# Patient Record
Sex: MALE | Race: WHITE | NOT HISPANIC OR LATINO | Employment: FULL TIME | URBAN - METROPOLITAN AREA
[De-identification: names, ages, dates, MRNs, and addresses within clinical notes are randomized per-mention and may not be internally consistent; named-entity substitution may affect disease eponyms.]

---

## 2023-08-18 ENCOUNTER — MEDICAL CORRESPONDENCE (OUTPATIENT)
Dept: HEALTH INFORMATION MANAGEMENT | Facility: CLINIC | Age: 53
End: 2023-08-18
Payer: COMMERCIAL

## 2023-08-18 ENCOUNTER — TRANSFERRED RECORDS (OUTPATIENT)
Dept: HEALTH INFORMATION MANAGEMENT | Facility: CLINIC | Age: 53
End: 2023-08-18
Payer: COMMERCIAL

## 2023-08-25 ENCOUNTER — TRANSFERRED RECORDS (OUTPATIENT)
Dept: HEALTH INFORMATION MANAGEMENT | Facility: CLINIC | Age: 53
End: 2023-08-25

## 2023-08-28 ENCOUNTER — OFFICE VISIT (OUTPATIENT)
Dept: CARDIOLOGY | Facility: CLINIC | Age: 53
End: 2023-08-28
Payer: COMMERCIAL

## 2023-08-28 VITALS
WEIGHT: 228 LBS | RESPIRATION RATE: 16 BRPM | BODY MASS INDEX: 30.22 KG/M2 | SYSTOLIC BLOOD PRESSURE: 114 MMHG | DIASTOLIC BLOOD PRESSURE: 60 MMHG | HEIGHT: 73 IN | HEART RATE: 52 BPM

## 2023-08-28 DIAGNOSIS — R55 VASOVAGAL SYNCOPE: Primary | ICD-10-CM

## 2023-08-28 DIAGNOSIS — E78.5 HYPERLIPIDEMIA LDL GOAL <100: ICD-10-CM

## 2023-08-28 PROCEDURE — 99204 OFFICE O/P NEW MOD 45 MIN: CPT | Performed by: INTERNAL MEDICINE

## 2023-08-28 RX ORDER — CETIRIZINE HYDROCHLORIDE 10 MG/1
10 TABLET ORAL DAILY
COMMUNITY

## 2023-08-28 RX ORDER — TADALAFIL 5 MG/1
5 TABLET ORAL PRN
COMMUNITY
Start: 2022-03-09

## 2023-08-28 NOTE — PATIENT INSTRUCTIONS
We discussed vasodepressor syncope which is a sudden drop in blood pressure typically when standing or changing positions promptly..We discussed keeping yourself well hydrated but add electrolytes. Gatorade, liquid IV would be a good choice.We talked about liberalizing the salt in your diet and at least thinking about adding back some carbs.I will be in touch with the echo results and the calcium score.My nurse is Mariola and her number is [phone].  Please make an appointment to see your primary physician regarding the sensation of a lump in your throat feeling.  Please call with any additional questions or concerns.

## 2023-08-28 NOTE — LETTER
8/28/2023    Jorge L Villa MD  [facility] [address] [place] [id number]    RE: Jorge L Anglin       Dear Colleague,     I had the pleasure of seeing Jorge L Anglin in the [facility].    HEART CARE ENCOUNTER CONSULTATON NOTE      [facility]  [phone]      Assessment/Recommendations   Assessment/Plan:   1.  History of syncopal episodes and lightheaded episodes that appear primarily associated with standing or bending over.  Symptoms sound consistent with vasodepressive type syncope.  I reviewed this in detail with him today.  We talked about nonmedication maneuvers that he could consider as the initial approach.  We talked about keeping himself well-hydrated and using electrolyte solutions like liquid IV.  We talked about being conscious of changing positions slowly and recognizing when he feels lightheaded and sitting down or lying down until it passes.  He does follow-up relatively strict keto/low carbohydrate diet as well as tries not to eat a significant amount of salt in his diet.  He does not have high blood pressure and in fact runs a somewhat lower blood pressure.  I discussed that he may wish to consider liberalizing the salt in his diet by eating pickles or other similar type foods that are not fried or processed.  We also discussed the potential relationship to the keto diet and I asked him to at least consider liberalizing the carbohydrates in his diet which she was going to consider.  In addition support stockings could be considered.  At this point I would not consider medical treatment but would asked that he try to make some of these lifestyle changes and then update me with how he is doing.  Given history of prior pericarditis with I have recommended a follow-up echocardiogram as well.    2.  Dyslipidemia.  Cholesterol numbers recently completed revealed a total cholesterol of 218, HDL 61, LDL of 140.  He had a CT scan that reported no  coronary calcium a few years ago but we discussed follow-up coronary calcium scoring to further define current risk.  He is in agreement.    3.  He mentions a awareness of a lump in his throat that is somewhat persistent.  I asked him to be in touch with his primary care physician with this finding.    1.  Complete the event monitor that he is currently wearing  2.  Echocardiogram given the lightheadedness and prior pericarditis history  3.  Coronary calcium scoring  4.  Liberalize electrolytes and fluid intake.  We discussed liberalizing salt in his diet and considering increasing amount of carbohydrates that he eats in his diet.  He will contemplate these recommendations.  5.  Follow-up pending the above.  6.  Follow-up with primary care regarding this awareness of a lump sensation in his throat that is persistent.      ECG today reveals sinus bradycardia 55 bpm otherwise normal-appearing EKG.     History of Present Illness/Subjective    HPI: Jorge L Anglin is a 53 year old male who is seen as a new patient.Hx of Pericarditis in 2021 per chart review.  He presented with chest discomfort and pericardial type pain in May 2021.  Interestingly, this was shortly after his second Covid vaccination shot.  He was diagnosed with acute pericarditis.  He had significant elevation of his C-reactive protein.  He had an echocardiogram that raised question of very early constriction.  He was started on anti-inflammatory therapy and colchicine.  This is based on the note from Dr. Linares in 2021.  Recommendations were for follow-up lipids.  Recommendations were for consideration of coronary calcium score in 5 years.  He reportedly had a chest CT that demonstrated no coronary calcium at the time of the diagnosis of pericarditis.  I have located the CT it was not dedicated calcium score but rather a CT of the chest and abdomen which commented on no calcium identified.     His C-reactive protein quickly normalized.  He felt much  better.  In June 2021 he underwent a cardiac MRI.  This showed evidence for the pericarditis but no evidence of constriction.  No significant myocarditis was seen.  There is a nuclear stress test report from December 2022 that reported the patient exercised 10 minutes on a Mehdi protocol with a normal stress and rest perfusion with an ejection fraction of 66%.  There is an echocardiogram report from November 2021 that demonstrated trivial pericardial effusion with normal left ventricular systolic function and mild left ventricular hypertrophy.  Notes from primary care are reviewed.  The note indicates this history of acute idiopathic pericarditis.  He had reported 4 syncopal episodes at the time of his visit August 19, 2023.  A 14-day event monitor was ordered.    He is experiencing intermittent lightheadedness and 3 episodes of syncope.  Lightheaded episodes are either with standing abruptly, bending over or standing for length of time.  The 3 syncopal episodes date back over the last 2 years.  One episode was while getting out of the shower, second episode while getting out of the hot tub third episode while standing for a long period of time in [place].  He does indicate that he has a few warning and then he feels lightheaded and that his vision is closing and then lost consciousness.    He follows a keto diet and continues to eat a low carbohydrate diet.  He tries to limit the salt in his diet.    Recent lab work includes: Potassium of 4, chloride 102, CO2 26, BUN of 12, creatinine 0.93, calcium 9.8, AST of 26, ALT of 35, there is a sodium listed which is difficult to discern but it is listed as being from January 2020 but no sodium on the most recent lab work.  Cholesterol total of 218, HDL 61, , triglycerides of 73.  Recent Echocardiogram Results:  See note describing above.        INDICATION(S): Chest pain     I have reviewed the indications for the stress test, patient past medical   history, current  medications, and the notes of the nurse richard.     Resting EKG: normal EKG, normal sinus rhythm, unchanged from previous   tracings.     Jorge L Anglin started in Stage 1 of the Mehdi protocol and exercised   for total duration of 10:00 minutes to Stage 4, reaching a maximum heart   rate of  169 , (target heart rate 166), maximum /76 [place]   [place] of [address].  The exercise was stopped due to fatigue . Patient   symptoms: fatigue  EKG showed no arrhymthia and no ischemic changes  .    The patient was followed in the recovery phase for 5 minutes. Cardiolyte   images were obtained before and after exercise.       ASSESSMENT:      negative based on No ECG evidence of ischemia.      Satisfactory stress test with adequate heart rate and double product.     See separate report for results of myocardial perfusion images.     Please contact me if you have any questions regarding the EKG portion of   this stress test.     Saul Ortiz MD ....................  12/9/2022   12:32 PM      Physical Examination  Review of Systems   Vitals: 114/60, 118/68 during my examination, weight 228 pounds, heart rate of 52-65 and regular  Wt Readings from Last 3 Encounters:   No data found for Wt       General Appearance:   no distress, normal body habitus   ENT/Mouth: membranes moist, no oral lesions or bleeding gums.      EYES:  no scleral icterus, normal conjunctivae   Neck: no carotid bruits    Chest/Lungs:   lungs are clear to auscultation, no rales or wheezing, equal chest wall expansion    Cardiovascular:   Regular. Normal first and second heart sounds with 1/6 to 2/6 systolic murmur left ventricular apex, rubs, or gallops; the carotid, radial and posterior tibial pulses are intact, Jugular venous pressure within normal limits, no significant edema bilaterally    Abdomen:  no bruits, or tenderness; bowel sounds are present   Extremities: no cyanosis or clubbing   Skin: no xanthelasma, warm.    Neurologic:  no  tremors     Psychiatric: alert and oriented x3, calm        Please refer above for cardiac ROS details.        Medical History  Surgical History Family History Social History   History of pericarditis  Lattice degeneration of both retinas  Obesity  Erectile dysfunction  Syncope No past surgical history on file.  Mother diabetes, melanoma,  Father: Heart disease myocardial infarction  Family history of colon cancer     Social History     Socioeconomic History    Marital status:      Spouse name: Not on file    Number of children: Not on file    Years of education: Not on file    Highest education level: Not on file   Occupational History    Not on file   Tobacco Use    Smoking status: Not on file    Smokeless tobacco: Not on file   Substance and Sexual Activity    Alcohol use: Not on file    Drug use: Not on file    Sexual activity: Not on file   Other Topics Concern    Not on file   Social History Narrative    Not on file     Social Determinants of Health     Financial Resource Strain: Not on file   Food Insecurity: Not on file   Transportation Needs: Not on file   Physical Activity: Not on file   Stress: Not on file   Social Connections: Not on file   Intimate Partner Violence: Not on file   Housing Stability: Not on file           Medications  Allergies   No current outpatient medications on file.     Not on File       Lab Results    Chemistry/lipid CBC Cardiac Enzymes/BNP/TSH/INR   No results for input(s): CHOL, HDL, LDL, TRIG, CHOLHDLRATIO in the last 58897 hours.  No results for input(s): LDL in the last 77799 hours.  No results for input(s): NA, POTASSIUM, CHLORIDE, CO2, GLC, BUN, CR, GFRESTIMATED, RADHA in the last 49313 hours.    Invalid input(s): GRFESTBLACK  No results for input(s): CR in the last 02932 hours.  No results for input(s): A1C in the last 82986 hours.       No results for input(s): WBC, HGB, HCT, MCV, PLT in the last 93812 hours.  No results for input(s): HGB in the last 54595 hours. No  results for input(s): TROPONINI in the last 63766 hours.  No results for input(s): BNP, NTBNPI, NTBNP in the last 30464 hours.  No results for input(s): TSH in the last 43925 hours.  No results for input(s): INR in the last 91701 hours.     Jovanny Sebastian MD      Thank you for allowing me to participate in the care of your patient.      Sincerely,     Jovanny Sebastian MD     [facility]  cc:   [place] [address]

## 2023-08-28 NOTE — PROGRESS NOTES
HEART CARE ENCOUNTER CONSULTATON NOTE      [facility]  [phone]      Assessment/Recommendations   Assessment/Plan:   1.  History of syncopal episodes and lightheaded episodes that appear primarily associated with standing or bending over.  Symptoms sound consistent with vasodepressive type syncope.  I reviewed this in detail with him today.  We talked about nonmedication maneuvers that he could consider as the initial approach.  We talked about keeping himself well-hydrated and using electrolyte solutions like liquid IV.  We talked about being conscious of changing positions slowly and recognizing when he feels lightheaded and sitting down or lying down until it passes.  He does follow-up relatively strict keto/low carbohydrate diet as well as tries not to eat a significant amount of salt in his diet.  He does not have high blood pressure and in fact runs a somewhat lower blood pressure.  I discussed that he may wish to consider liberalizing the salt in his diet by eating pickles or other similar type foods that are not fried or processed.  We also discussed the potential relationship to the keto diet and I asked him to at least consider liberalizing the carbohydrates in his diet which she was going to consider.  In addition support stockings could be considered.  At this point I would not consider medical treatment but would asked that he try to make some of these lifestyle changes and then update me with how he is doing.  Given history of prior pericarditis with I have recommended a follow-up echocardiogram as well.    2.  Dyslipidemia.  Cholesterol numbers recently completed revealed a total cholesterol of 218, HDL 61, LDL of 140.  He had a CT scan that reported no coronary calcium a few years ago but we discussed follow-up coronary calcium scoring to further define current risk.  He is in agreement.    3.  He mentions a awareness of a lump in his throat that is somewhat persistent.  I  asked him to be in touch with his primary care physician with this finding.    1.  Complete the event monitor that he is currently wearing  2.  Echocardiogram given the lightheadedness and prior pericarditis history  3.  Coronary calcium scoring  4.  Liberalize electrolytes and fluid intake.  We discussed liberalizing salt in his diet and considering increasing amount of carbohydrates that he eats in his diet.  He will contemplate these recommendations.  5.  Follow-up pending the above.  6.  Follow-up with primary care regarding this awareness of a lump sensation in his throat that is persistent.      ECG today reveals sinus bradycardia 55 bpm otherwise normal-appearing EKG.     History of Present Illness/Subjective    HPI: Jorge L Anglin is a 53 year old male who is seen as a new patient.Hx of Pericarditis in 2021 per chart review.  He presented with chest discomfort and pericardial type pain in May 2021.  Interestingly, this was shortly after his second Covid vaccination shot.  He was diagnosed with acute pericarditis.  He had significant elevation of his C-reactive protein.  He had an echocardiogram that raised question of very early constriction.  He was started on anti-inflammatory therapy and colchicine.  This is based on the note from Dr. Linares in 2021.  Recommendations were for follow-up lipids.  Recommendations were for consideration of coronary calcium score in 5 years.  He reportedly had a chest CT that demonstrated no coronary calcium at the time of the diagnosis of pericarditis.  I have located the CT it was not dedicated calcium score but rather a CT of the chest and abdomen which commented on no calcium identified.     His C-reactive protein quickly normalized.  He felt much better.  In June 2021 he underwent a cardiac MRI.  This showed evidence for the pericarditis but no evidence of constriction.  No significant myocarditis was seen.  There is a nuclear stress test report from December 2022 that  reported the patient exercised 10 minutes on a Mehdi protocol with a normal stress and rest perfusion with an ejection fraction of 66%.  There is an echocardiogram report from November 2021 that demonstrated trivial pericardial effusion with normal left ventricular systolic function and mild left ventricular hypertrophy.  Notes from primary care are reviewed.  The note indicates this history of acute idiopathic pericarditis.  He had reported 4 syncopal episodes at the time of his visit August 19, 2023.  A 14-day event monitor was ordered.    He is experiencing intermittent lightheadedness and 3 episodes of syncope.  Lightheaded episodes are either with standing abruptly, bending over or standing for length of time.  The 3 syncopal episodes date back over the last 2 years.  One episode was while getting out of the shower, second episode while getting out of the hot tub third episode while standing for a long period of time in [place].  He does indicate that he has a few warning and then he feels lightheaded and that his vision is closing and then lost consciousness.    He follows a keto diet and continues to eat a low carbohydrate diet.  He tries to limit the salt in his diet.    Recent lab work includes: Potassium of 4, chloride 102, CO2 26, BUN of 12, creatinine 0.93, calcium 9.8, AST of 26, ALT of 35, there is a sodium listed which is difficult to discern but it is listed as being from January 2020 but no sodium on the most recent lab work.  Cholesterol total of 218, HDL 61, , triglycerides of 73.  Recent Echocardiogram Results:  See note describing above.        INDICATION(S): Chest pain     I have reviewed the indications for the stress test, patient past medical   history, current medications, and the notes of the nurse richard.     Resting EKG: normal EKG, normal sinus rhythm, unchanged from previous   tracings.     Jorge L Anglin started in Stage 1 of the Mehdi protocol and exercised   for total  duration of 10:00 minutes to Stage 4, reaching a maximum heart   rate of  169 , (target heart rate 166), maximum /76 and double   product of [address].  The exercise was stopped due to fatigue . Patient   symptoms: fatigue  EKG showed no arrhymthia and no ischemic changes  .    The patient was followed in the recovery phase for 5 minutes. Cardiolyte   images were obtained before and after exercise.       ASSESSMENT:      negative based on No ECG evidence of ischemia.      Satisfactory stress test with adequate heart rate and double product.     See separate report for results of myocardial perfusion images.     Please contact me if you have any questions regarding the EKG portion of   this stress test.     Saul Ortiz MD ....................  12/9/2022   12:32 PM      Physical Examination  Review of Systems   Vitals: 114/60, 118/68 during my examination, weight 228 pounds, heart rate of 52-65 and regular  Wt Readings from Last 3 Encounters:   No data found for Wt       General Appearance:   no distress, normal body habitus   ENT/Mouth: membranes moist, no oral lesions or bleeding gums.      EYES:  no scleral icterus, normal conjunctivae   Neck: no carotid bruits    Chest/Lungs:   lungs are clear to auscultation, no rales or wheezing, equal chest wall expansion    Cardiovascular:   Regular. Normal first and second heart sounds with 1/6 to 2/6 systolic murmur left ventricular apex, rubs, or gallops; the carotid, radial and posterior tibial pulses are intact, Jugular venous pressure within normal limits, no significant edema bilaterally    Abdomen:  no bruits, or tenderness; bowel sounds are present   Extremities: no cyanosis or clubbing   Skin: no xanthelasma, warm.    Neurologic:  no tremors     Psychiatric: alert and oriented x3, calm        Please refer above for cardiac ROS details.        Medical History  Surgical History Family History Social History   History of pericarditis  Lattice degeneration of both  retinas  Obesity  Erectile dysfunction  Syncope No past surgical history on file.  Mother diabetes, melanoma,  Father: Heart disease myocardial infarction  Family history of colon cancer     Social History     Socioeconomic History    Marital status:      Spouse name: Not on file    Number of children: Not on file    Years of education: Not on file    Highest education level: Not on file   Occupational History    Not on file   Tobacco Use    Smoking status: Not on file    Smokeless tobacco: Not on file   Substance and Sexual Activity    Alcohol use: Not on file    Drug use: Not on file    Sexual activity: Not on file   Other Topics Concern    Not on file   Social History Narrative    Not on file     Social Determinants of Health     Financial Resource Strain: Not on file   Food Insecurity: Not on file   Transportation Needs: Not on file   Physical Activity: Not on file   Stress: Not on file   Social Connections: Not on file   Intimate Partner Violence: Not on file   Housing Stability: Not on file           Medications  Allergies   No current outpatient medications on file.     Not on File       Lab Results    Chemistry/lipid CBC Cardiac Enzymes/BNP/TSH/INR   No results for input(s): CHOL, HDL, LDL, TRIG, CHOLHDLRATIO in the last 78187 hours.  No results for input(s): LDL in the last 39266 hours.  No results for input(s): NA, POTASSIUM, CHLORIDE, CO2, GLC, BUN, CR, GFRESTIMATED, RADHA in the last 06583 hours.    Invalid input(s): GRFESTBLACK  No results for input(s): CR in the last 32736 hours.  No results for input(s): A1C in the last 75052 hours.       No results for input(s): WBC, HGB, HCT, MCV, PLT in the last 49586 hours.  No results for input(s): HGB in the last 15656 hours. No results for input(s): TROPONINI in the last 84925 hours.  No results for input(s): BNP, NTBNPI, NTBNP in the last 48219 hours.  No results for input(s): TSH in the last 88321 hours.  No results for input(s): INR in the last 47644  hours.     Jovanny Sebastian MD

## 2023-09-15 ENCOUNTER — TRANSFERRED RECORDS (OUTPATIENT)
Dept: HEALTH INFORMATION MANAGEMENT | Facility: CLINIC | Age: 53
End: 2023-09-15

## 2023-09-19 ENCOUNTER — HOSPITAL ENCOUNTER (OUTPATIENT)
Dept: CT IMAGING | Facility: CLINIC | Age: 53
Discharge: HOME | End: 2023-09-19
Attending: INTERNAL MEDICINE
Payer: COMMERCIAL

## 2023-09-19 ENCOUNTER — HOSPITAL ENCOUNTER (OUTPATIENT)
Dept: CARDIOLOGY | Facility: CLINIC | Age: 53
Discharge: HOME | End: 2023-09-19
Attending: INTERNAL MEDICINE
Payer: COMMERCIAL

## 2023-09-19 DIAGNOSIS — E78.5 HYPERLIPIDEMIA LDL GOAL <100: ICD-10-CM

## 2023-09-19 DIAGNOSIS — R55 VASOVAGAL SYNCOPE: ICD-10-CM

## 2023-09-19 LAB
CV CALCIUM SCORE AGATSTON LM: 0
CV CALCIUM SCORING AGATSON LAD: 0
CV CALCIUM SCORING AGATSTON CX: 0
CV CALCIUM SCORING AGATSTON RCA: 10
CV CALCIUM SCORING AGATSTON TOTAL: 10
LVEF ECHO: NORMAL

## 2023-09-19 PROCEDURE — 75571 CT HRT W/O DYE W/CA TEST: CPT

## 2023-09-19 PROCEDURE — 75571 CT HRT W/O DYE W/CA TEST: CPT | Mod: 26 | Performed by: INTERNAL MEDICINE

## 2023-09-19 PROCEDURE — 93306 TTE W/DOPPLER COMPLETE: CPT | Mod: 26 | Performed by: INTERNAL MEDICINE

## 2023-09-19 PROCEDURE — 93306 TTE W/DOPPLER COMPLETE: CPT

## 2023-09-20 NOTE — RESULT ENCOUNTER NOTE
Echo looks normal, some left atrial enlargement,he had an event monitor on thru [facility], can we track down the results,mild increase in calcium score of 10, my chart note sent mdg

## 2023-10-31 ENCOUNTER — OFFICE VISIT (OUTPATIENT)
Dept: CARDIOLOGY | Facility: CLINIC | Age: 53
End: 2023-10-31
Attending: INTERNAL MEDICINE
Payer: COMMERCIAL

## 2023-10-31 VITALS
DIASTOLIC BLOOD PRESSURE: 68 MMHG | WEIGHT: 222 LBS | RESPIRATION RATE: 14 BRPM | HEART RATE: 52 BPM | SYSTOLIC BLOOD PRESSURE: 100 MMHG | BODY MASS INDEX: 29.29 KG/M2

## 2023-10-31 DIAGNOSIS — R55 VASOVAGAL SYNCOPE: ICD-10-CM

## 2023-10-31 DIAGNOSIS — E78.5 HYPERLIPIDEMIA LDL GOAL <100: ICD-10-CM

## 2023-10-31 PROCEDURE — 99214 OFFICE O/P EST MOD 30 MIN: CPT | Performed by: INTERNAL MEDICINE

## 2023-10-31 NOTE — PROGRESS NOTES
HEART CARE ENCOUNTER CONSULTATON NOTE      [facility]  [phone]      Assessment/Recommendations   Assessment/Plan:  1.  History of syncopal episodes and lightheadedness that appears primarily related to position changes.  I felt symptoms were most consistent with vasodepressive syncope.  We talked about keeping himself well-hydrated and we talked about use of electrolyte solutions and modifying his diet.  He has been following a very strict keto/low carbohydrate diet as well as a lower salt diet without a history of hypertension.  He does not have hypertension and in fact is running somewhat lower blood pressures and we talked about the potential for liberalizing the salt in his diet with foods like pickles.  We talked about utilizing support stockings and making lifestyle changes.  He tells me that in the interim he liberalized his salt in his symptoms of abated.    2.  Dyslipidemia.  Cholesterol numbers most recently are from August 18, 2023 at which time total cholesterol was 218, , normal AST and ALT.  I reviewed his recent coronary calcium score that returned at 10.  Mild increased risk and we discussed this in context of his cholesterol numbers.  We discussed this at length.  At this point he has decided against initiating statins.  His 10-year cardiovascular risk based on the [employer] database is approximately 3%.  We talked about ongoing prudent diet and exercise.    3 history of pericarditis dating back to May 2021.  Recent echocardiogram suggested normal systolic function with a trace pericardial effusion reported.  He is not describing symptoms and will continue to monitor.      Plan as outlined above with follow-up in 6 to 12 months.       History of Present Illness/Subjective    HPI: Jorge L Anglin is a 53 year old male who is seen in follow up.He presented with chest discomfort and pericardial type pain in May 2021 based on review of chart records..  Interestingly, this  was shortly after his second Covid vaccination shot.  He was diagnosed with acute pericarditis.  He had significant elevation of his C-reactive protein.  He had an echocardiogram that raised question of very early constriction.  He was started on anti-inflammatory therapy and colchicine.  This is based on the note from Dr. Linares in 2021.  Recommendations were for follow-up lipids.  Recommendations were for consideration of coronary calcium score in 5 years.  He reportedly had a chest CT that demonstrated no coronary calcium at the time of the diagnosis of pericarditis.  I have located the CT it was not dedicated calcium score but rather a CT of the chest and abdomen which commented on no calcium identified.     His C-reactive protein quickly normalized.  He felt much better.  In June 2021 he underwent a cardiac MRI.  This showed evidence for the pericarditis but no evidence of constriction.  No significant myocarditis was seen.  There is a nuclear stress test report from December 2022 that reported the patient exercised 10 minutes on a Mehdi protocol with a normal stress and rest perfusion with an ejection fraction of 66%.  Most recently he underwent echocardiography September 2023 where left ventricular function was reported to be normal.  With an ejection fraction of 60 to 65% with trivial pericardial effusion.  Event monitor results were pending.    Calcium score of 10 as outlined below.  There is a monitor from his outside clinic completed at the end of August early September that reported average heart rate of 62 bpm, first-degree AV block, isolated ectopic complexes.      He reports that he has been feeling well.  He states that the dizzy symptoms have resolved since he increased the salt in his diet.  He denies recurrent chest discomfort, shortness of breath, dizziness or lightheadedness.  Recent Echocardiogram Results:  Narrative & Impression  [id number]  [id number]  [id number]  [id number]^GERARD^IZABELLA^[place] [address]     Name: ENOC MARSH  MRN: [id number]  : 1970  Study Date: 2023 12:44 PM  Age: 53 yrs  Gender: Male  Patient Location: [facility]  Reason For Study: Vasovagal syncope  Ordering Physician: IZABELLA GEE  Referring Physician: IZABELLA GEE  Performed By: CORA     BSA: 2.3 m2  Height: 73 in  Weight: 228 lb  HR: 60  BP: 116/65 mmHg  ______________________________________________________________________________  Procedure  Complete Echo Adult. No hemodynamically significant valvular abnormalities on  2D or color flow imaging.  ______________________________________________________________________________  Interpretation Summary     The left ventricle is normal in size.  Left ventricular function is normal.The ejection fraction is 60-65%.  Normal right ventricle size and systolic function.  The left atrium is moderately dilated.  Trivial pericardial effusion  No hemodynamically significant valvular abnormalities on 2D or color flow  imaging.  ______________________________________________________________________________  Left Ventricle  The left ventricle is normal in size. Left ventricular function is normal.The  ejection fraction is 60-65%. There is normal left ventricular wall thickness.  Left ventricular diastolic function is normal. No regional wall motion  abnormalities noted.     Right Ventricle  Normal right ventricle size and systolic function.     Atria  The left atrium is moderately dilated. Right atrial size is normal. There is  no color Doppler evidence of an atrial shunt.     Mitral Valve  Mitral valve leaflets appear normal. There is mild (1+) mitral regurgitation.     Tricuspid Valve  The tricuspid valve is not well visualized, but is grossly normal. There is  trace tricuspid regurgitation. Right ventricular systolic pressure could not  be approximated due to inadequate tricuspid regurgitation.     Aortic Valve  Aortic valve  leaflets appear normal. There is no evidence of aortic stenosis  or clinically significant aortic regurgitation.     Pulmonic Valve  The pulmonic valve is not well seen, but is grossly normal. This degree of  valvular regurgitation is within normal limits.     Vessels  The aorta root is normal. Normal size ascending aorta. IVC diameter <2.1 cm  collapsing >50% with sniff suggests a normal RA pressure of 3 mmHg.     Pericardium  Trivial pericardial effusion.         Recent Coronary Angiogram Results:  Jorge L Anglin MRN   [id number] Legal Sex   Male              Age   1970 (53 year old)     Reason for Exam  Priority: Routine  CAD, known or r/o; No acute coronary syndrome; Risk score not calculated   Dx: Vasovagal syncope [R55 (ICD-10-CM)]; Hyperlipidemia LDL goal <100 [E78.5 (ICD-10-CM)]     Indications    Vasovagal syncope [R55 (ICD-10-CM)]   Hyperlipidemia LDL goal <100 [E78.5 (ICD-10-CM)]     Interpretation Summary    Total coronary calcium score 10 in RCA places the individual in the 25th percentile when compared to an age and gender matched control group and implies a low risk of cardiac events in the next ten years (less than 1% per year).       Anatomical Region Laterality Modality   Chest -- Magnetic Resonance   Lung -- --   MR Cardiac -- --     Narrative       Conclusions:     1. Normal LV size, wall thickness and systolic function - LVEF 68%.   2. No late gadolinium enhancement.   3. Normal RV size and function.     4. Moderate LA dilatation.  Mild to moderate RA dilatation.     5. The pericardium is mildly thickened. There is a small pericardial  effusion that is primarily along the     lateral cardiac border. Small Focal area of tethering along the lateral  border. Pericardial patchy late     gadolinium enhancement along the lateral cardiac border, but not in other  areas of the pericardium. No     septal shiver, No evidence of pericardial constriction.       Cardiac MRI is consistent with  pericarditis, no constriction.       Summary:     This study was performed exclusively to study the heart and the thoracic  aorta.   Co-readers on this study included: Ginny Cervantes MD     Physical Examination  Review of Systems   Vitals: 100/68, pulse 52, weight 222 pounds  Wt Readings from Last 3 Encounters:   08/28/23 103.4 kg (228 lb)       General Appearance:   no distress, normal body habitus   ENT/Mouth: membranes moist, no oral lesions or bleeding gums.      EYES:  no scleral icterus, normal conjunctivae   Neck: no carotid bruits    Chest/Lungs:   lungs are clear to auscultation, no rales or wheezing,  equal chest wall expansion    Cardiovascular:   Regular. Normal first and second heart sounds with no murmurs, rubs, or gallops; the carotid, radial and posterior tibial pulses are intact, Jugular venous pressure within normal limits, no edema bilaterally    Abdomen:  No bruits, or tenderness; bowel sounds are present   Extremities: no cyanosis or clubbing   Skin: no xanthelasma, warm.    Neurologic: no tremors     Psychiatric: alert and oriented x3, calm        Please refer above for cardiac ROS details.        Medical History  Surgical History Family History Social History   No past medical history on file.  No past surgical history on file.  No family history on file.     Social History     Socioeconomic History    Marital status:      Spouse name: Not on file    Number of children: Not on file    Years of education: Not on file    Highest education level: Not on file   Occupational History    Not on file   Tobacco Use    Smoking status: Never    Smokeless tobacco: Never   Vaping Use    Vaping Use: Never used   Substance and Sexual Activity    Alcohol use: Not on file    Drug use: Not on file    Sexual activity: Not on file   Other Topics Concern    Not on file   Social History Narrative    Not on file     Social Determinants of Health     Financial Resource Strain: Not on file   Food Insecurity:  "Not on file   Transportation Needs: Not on file   Physical Activity: Not on file   Stress: Not on file   Social Connections: Not on file   Interpersonal Safety: Not on file   Housing Stability: Not on file           Medications  Allergies   Current Outpatient Medications   Medication Sig Dispense Refill    cetirizine (ZYRTEC) 10 MG tablet Take 10 mg by mouth daily      tadalafil (CIALIS) 5 MG tablet Take 5 mg by mouth as needed       No Known Allergies       Lab Results    Chemistry/lipid CBC Cardiac Enzymes/BNP/TSH/INR   No results for input(s): \"CHOL\", \"HDL\", \"LDL\", \"TRIG\", \"CHOLHDLRATIO\" in the last 21680 hours.  No results for input(s): \"LDL\" in the last 15881 hours.  No results for input(s): \"NA\", \"POTASSIUM\", \"CHLORIDE\", \"CO2\", \"GLC\", \"BUN\", \"CR\", \"GFRESTIMATED\", \"RADHA\" in the last 15115 hours.    Invalid input(s): \"GRFESTBLACK\"  No results for input(s): \"CR\" in the last 00497 hours.  No results for input(s): \"A1C\" in the last 67521 hours.       No results for input(s): \"WBC\", \"HGB\", \"HCT\", \"MCV\", \"PLT\" in the last 22550 hours.  No results for input(s): \"HGB\" in the last 97829 hours. No results for input(s): \"TROPONINI\" in the last 81341 hours.  No results for input(s): \"BNP\", \"NTBNPI\", \"NTBNP\" in the last 81446 hours.  No results for input(s): \"TSH\" in the last 31347 hours.  No results for input(s): \"INR\" in the last 82610 hours.     Jovanny Sebastian MD                                    "

## 2023-10-31 NOTE — LETTER
10/31/2023    Jorge L Villa MD  [facility] [address] [place] [id number]    RE: Jorge L Anglin       Dear Colleague,     I had the pleasure of seeing Jorge L Anglin in the [facility].    HEART CARE ENCOUNTER CONSULTATON NOTE      [facility]  [phone]      Assessment/Recommendations   Assessment/Plan:  1.  History of syncopal episodes and lightheadedness that appears primarily related to position changes.  I felt symptoms were most consistent with vasodepressive syncope.  We talked about keeping himself well-hydrated and we talked about use of electrolyte solutions and modifying his diet.  He has been following a very strict keto/low carbohydrate diet as well as a lower salt diet without a history of hypertension.  He does not have hypertension and in fact is running somewhat lower blood pressures and we talked about the potential for liberalizing the salt in his diet with foods like pickles.  We talked about utilizing support stockings and making lifestyle changes.  He tells me that in the interim he liberalized his salt in his symptoms of abated.    2.  Dyslipidemia.  Cholesterol numbers most recently are from August 18, 2023 at which time total cholesterol was 218, , normal AST and ALT.  I reviewed his recent coronary calcium score that returned at 10.  Mild increased risk and we discussed this in context of his cholesterol numbers.  We discussed this at length.  At this point he has decided against initiating statins.  His 10-year cardiovascular risk based on the [employer] database is approximately 3%.  We talked about ongoing prudent diet and exercise.    3 history of pericarditis dating back to May 2021.  Recent echocardiogram suggested normal systolic function with a trace pericardial effusion reported.  He is not describing symptoms and will continue to monitor.      Plan as outlined above with follow-up in 6 to 12 months.       History of  Present Illness/Subjective    HPI: Jorge L Anglin is a 53 year old male who is seen in follow up.He presented with chest discomfort and pericardial type pain in May 2021 based on review of chart records..  Interestingly, this was shortly after his second Covid vaccination shot.  He was diagnosed with acute pericarditis.  He had significant elevation of his C-reactive protein.  He had an echocardiogram that raised question of very early constriction.  He was started on anti-inflammatory therapy and colchicine.  This is based on the note from Dr. Linares in 2021.  Recommendations were for follow-up lipids.  Recommendations were for consideration of coronary calcium score in 5 years.  He reportedly had a chest CT that demonstrated no coronary calcium at the time of the diagnosis of pericarditis.  I have located the CT it was not dedicated calcium score but rather a CT of the chest and abdomen which commented on no calcium identified.     His C-reactive protein quickly normalized.  He felt much better.  In June 2021 he underwent a cardiac MRI.  This showed evidence for the pericarditis but no evidence of constriction.  No significant myocarditis was seen.  There is a nuclear stress test report from December 2022 that reported the patient exercised 10 minutes on a Mehdi protocol with a normal stress and rest perfusion with an ejection fraction of 66%.  Most recently he underwent echocardiography September 2023 where left ventricular function was reported to be normal.  With an ejection fraction of 60 to 65% with trivial pericardial effusion.  Event monitor results were pending.    Calcium score of 10 as outlined below.  There is a monitor from his outside clinic completed at the end of August early September that reported average heart rate of 62 bpm, first-degree AV block, isolated ectopic complexes.      He reports that he has been feeling well.  He states that the dizzy symptoms have resolved since he increased the  salt in his diet.  He denies recurrent chest discomfort, shortness of breath, dizziness or lightheadedness.  Recent Echocardiogram Results:  Narrative & Impression  [id number]  [id number]  [id number]  [id number]^GERARD^IZABELLA^[place] [address]     Name: ENOC MARSH  MRN: [id number]  : 1970  Study Date: 2023 12:44 PM  Age: 53 yrs  Gender: Male  Patient Location: [facility]  Reason For Study: Vasovagal syncope  Ordering Physician: IZABELLA GEE  Referring Physician: IZABELLA GEE  Performed By: CORA     BSA: 2.3 m2  Height: 73 in  Weight: 228 lb  HR: 60  BP: 116/65 mmHg  ______________________________________________________________________________  Procedure  Complete Echo Adult. No hemodynamically significant valvular abnormalities on  2D or color flow imaging.  ______________________________________________________________________________  Interpretation Summary     The left ventricle is normal in size.  Left ventricular function is normal.The ejection fraction is 60-65%.  Normal right ventricle size and systolic function.  The left atrium is moderately dilated.  Trivial pericardial effusion  No hemodynamically significant valvular abnormalities on 2D or color flow  imaging.  ______________________________________________________________________________  Left Ventricle  The left ventricle is normal in size. Left ventricular function is normal.The  ejection fraction is 60-65%. There is normal left ventricular wall thickness.  Left ventricular diastolic function is normal. No regional wall motion  abnormalities noted.     Right Ventricle  Normal right ventricle size and systolic function.     Atria  The left atrium is moderately dilated. Right atrial size is normal. There is  no color Doppler evidence of an atrial shunt.     Mitral Valve  Mitral valve leaflets appear normal. There is mild (1+) mitral regurgitation.     Tricuspid Valve  The tricuspid valve is not  well visualized, but is grossly normal. There is  trace tricuspid regurgitation. Right ventricular systolic pressure could not  be approximated due to inadequate tricuspid regurgitation.     Aortic Valve  Aortic valve leaflets appear normal. There is no evidence of aortic stenosis  or clinically significant aortic regurgitation.     Pulmonic Valve  The pulmonic valve is not well seen, but is grossly normal. This degree of  valvular regurgitation is within normal limits.     Vessels  The aorta root is normal. Normal size ascending aorta. IVC diameter <2.1 cm  collapsing >50% with sniff suggests a normal RA pressure of 3 mmHg.     Pericardium  Trivial pericardial effusion.         Recent Coronary Angiogram Results:  Jorge L Anglin MRN   [id number] Legal Sex   Male              Age   1970 (53 year old)     Reason for Exam  Priority: Routine  CAD, known or r/o; No acute coronary syndrome; Risk score not calculated   Dx: Vasovagal syncope [R55 (ICD-10-CM)]; Hyperlipidemia LDL goal <100 [E78.5 (ICD-10-CM)]     Indications    Vasovagal syncope [R55 (ICD-10-CM)]   Hyperlipidemia LDL goal <100 [E78.5 (ICD-10-CM)]     Interpretation Summary    Total coronary calcium score 10 in RCA places the individual in the 25th percentile when compared to an age and gender matched control group and implies a low risk of cardiac events in the next ten years (less than 1% per year).       Anatomical Region Laterality Modality   Chest -- Magnetic Resonance   Lung -- --   MR Cardiac -- --     Narrative       Conclusions:     1. Normal LV size, wall thickness and systolic function - LVEF 68%.   2. No late gadolinium enhancement.   3. Normal RV size and function.     4. Moderate LA dilatation.  Mild to moderate RA dilatation.     5. The pericardium is mildly thickened. There is a small pericardial  effusion that is primarily along the     lateral cardiac border. Small Focal area of tethering along the lateral  border. Pericardial  patchy late     gadolinium enhancement along the lateral cardiac border, but not in other  areas of the pericardium. No     septal shiver, No evidence of pericardial constriction.       Cardiac MRI is consistent with pericarditis, no constriction.       Summary:     This study was performed exclusively to study the heart and the thoracic  aorta.   Co-readers on this study included: Ginny Cervantes MD     Physical Examination  Review of Systems   Vitals: 100/68, pulse 52, weight 222 pounds  Wt Readings from Last 3 Encounters:   08/28/23 103.4 kg (228 lb)       General Appearance:   no distress, normal body habitus   ENT/Mouth: membranes moist, no oral lesions or bleeding gums.      EYES:  no scleral icterus, normal conjunctivae   Neck: no carotid bruits    Chest/Lungs:   lungs are clear to auscultation, no rales or wheezing,  equal chest wall expansion    Cardiovascular:   Regular. Normal first and second heart sounds with no murmurs, rubs, or gallops; the carotid, radial and posterior tibial pulses are intact, Jugular venous pressure within normal limits, no edema bilaterally    Abdomen:  No bruits, or tenderness; bowel sounds are present   Extremities: no cyanosis or clubbing   Skin: no xanthelasma, warm.    Neurologic: no tremors     Psychiatric: alert and oriented x3, calm        Please refer above for cardiac ROS details.        Medical History  Surgical History Family History Social History   No past medical history on file.  No past surgical history on file.  No family history on file.     Social History     Socioeconomic History    Marital status:      Spouse name: Not on file    Number of children: Not on file    Years of education: Not on file    Highest education level: Not on file   Occupational History    Not on file   Tobacco Use    Smoking status: Never    Smokeless tobacco: Never   Vaping Use    Vaping Use: Never used   Substance and Sexual Activity    Alcohol use: Not on file    Drug use: Not  "on file    Sexual activity: Not on file   Other Topics Concern    Not on file   Social History Narrative    Not on file     Social Determinants of Health     Financial Resource Strain: Not on file   Food Insecurity: Not on file   Transportation Needs: Not on file   Physical Activity: Not on file   Stress: Not on file   Social Connections: Not on file   Interpersonal Safety: Not on file   Housing Stability: Not on file           Medications  Allergies   Current Outpatient Medications   Medication Sig Dispense Refill    cetirizine (ZYRTEC) 10 MG tablet Take 10 mg by mouth daily      tadalafil (CIALIS) 5 MG tablet Take 5 mg by mouth as needed       No Known Allergies       Lab Results    Chemistry/lipid CBC Cardiac Enzymes/BNP/TSH/INR   No results for input(s): \"CHOL\", \"HDL\", \"LDL\", \"TRIG\", \"CHOLHDLRATIO\" in the last 51031 hours.  No results for input(s): \"LDL\" in the last 50359 hours.  No results for input(s): \"NA\", \"POTASSIUM\", \"CHLORIDE\", \"CO2\", \"GLC\", \"BUN\", \"CR\", \"GFRESTIMATED\", \"RADHA\" in the last 27352 hours.    Invalid input(s): \"GRFESTBLACK\"  No results for input(s): \"CR\" in the last 11368 hours.  No results for input(s): \"A1C\" in the last 04354 hours.       No results for input(s): \"WBC\", \"HGB\", \"HCT\", \"MCV\", \"PLT\" in the last 36761 hours.  No results for input(s): \"HGB\" in the last 55018 hours. No results for input(s): \"TROPONINI\" in the last 65172 hours.  No results for input(s): \"BNP\", \"NTBNPI\", \"NTBNP\" in the last 47283 hours.  No results for input(s): \"TSH\" in the last 56967 hours.  No results for input(s): \"INR\" in the last 51512 hours.     Jovanny Sebastian MD      Thank you for allowing me to participate in the care of your patient.      Sincerely,     Jovanny Sebastian MD     [facility]  cc:   Jovanny Sebastian MD  [address] [place] [id number]      "

## 2024-03-10 ENCOUNTER — HEALTH MAINTENANCE LETTER (OUTPATIENT)
Age: 54
End: 2024-03-10

## 2025-03-16 ENCOUNTER — HEALTH MAINTENANCE LETTER (OUTPATIENT)
Age: 55
End: 2025-03-16